# Patient Record
Sex: MALE | Race: AMERICAN INDIAN OR ALASKA NATIVE
[De-identification: names, ages, dates, MRNs, and addresses within clinical notes are randomized per-mention and may not be internally consistent; named-entity substitution may affect disease eponyms.]

---

## 2018-09-18 ENCOUNTER — HOSPITAL ENCOUNTER (EMERGENCY)
Dept: HOSPITAL 5 - ED | Age: 43
Discharge: HOME | End: 2018-09-18
Payer: SELF-PAY

## 2018-09-18 VITALS — DIASTOLIC BLOOD PRESSURE: 56 MMHG | SYSTOLIC BLOOD PRESSURE: 98 MMHG

## 2018-09-18 DIAGNOSIS — K27.9: Primary | ICD-10-CM

## 2018-09-18 PROCEDURE — 74018 RADEX ABDOMEN 1 VIEW: CPT

## 2018-09-18 PROCEDURE — 99283 EMERGENCY DEPT VISIT LOW MDM: CPT

## 2018-09-18 NOTE — EMERGENCY DEPARTMENT REPORT
ED Abdominal Pain HPI





- General


Chief Complaint: Abdominal Pain


Stated Complaint: STOMACH/BACK PAIN


Time Seen by Provider: 09/18/18 11:07


Source: patient


Mode of arrival: Ambulatory


Limitations: No Limitations





- History of Present Illness


Initial Comments: 





Patient is a 43-year-old American gentleman who is complaining of 3 days of 

epigastric discomfort.  Patient states he has some mild nausea but no vomiting.

  Patient also does have some slight loose stools.  Patient has a history of 

ulcers and states the pain feels somewhat similar.  Patient denies any fevers 

chills cough cold congestion at this time.  Patient states the pain is not 

worse with eating or laying flat.  Patient states pain is nonradiating





- Related Data


 Previous Rx's











 Medication  Instructions  Recorded  Last Taken  Type


 


Dicyclomine [Bentyl] 10 mg PO QID #15 capsule 09/18/18 Unknown Rx


 


Pantoprazole [Protonix TAB] 20 mg PO QDAY #30 tablet. 09/18/18 Unknown Rx


 


traMADol [Ultram] 50 mg PO Q6HR PRN #10 tablet 09/18/18 Unknown Rx











 Allergies











Allergy/AdvReac Type Severity Reaction Status Date / Time


 


No Known Allergies Allergy   Unverified 09/18/18 08:47














ED Review of Systems


ROS: 


Stated complaint: STOMACH/BACK PAIN


Other details as noted in HPI





Comment: All other systems reviewed and negative





ED Past Medical Hx





- Past Medical History


Additional medical history: ulcer





- Surgical History


Past Surgical History?: No





- Social History


Smoking Status: Never Smoker


Substance Use Type: None





- Medications


Home Medications: 


 Home Medications











 Medication  Instructions  Recorded  Confirmed  Last Taken  Type


 


Dicyclomine [Bentyl] 10 mg PO QID #15 capsule 09/18/18  Unknown Rx


 


Pantoprazole [Protonix TAB] 20 mg PO QDAY #30 tablet. 09/18/18  Unknown Rx


 


traMADol [Ultram] 50 mg PO Q6HR PRN #10 tablet 09/18/18  Unknown Rx














ED Physical Exam





- General


Limitations: No Limitations


General appearance: alert, in no apparent distress





- Head


Head exam: Present: atraumatic, normocephalic





- Eye


Eye exam: Present: normal appearance





- ENT


ENT exam: Present: mucous membranes moist





- Neck


Neck exam: Present: normal inspection





- Respiratory


Respiratory exam: Present: normal lung sounds bilaterally.  Absent: respiratory 

distress, wheezes, rales





- Cardiovascular


Cardiovascular Exam: Present: regular rate, normal rhythm.  Absent: systolic 

murmur, diastolic murmur, rubs, gallop





- GI/Abdominal


GI/Abdominal exam: Present: soft, normal bowel sounds.  Absent: distended, 

tenderness, guarding, rebound





- Rectal


Rectal exam: Present: deferred





- Extremities Exam


Extremities exam: Present: normal inspection





- Back Exam


Back exam: Present: normal inspection





- Neurological Exam


Neurological exam: Present: alert, oriented X3





- Psychiatric


Psychiatric exam: Present: normal affect, normal mood





- Skin


Skin exam: Present: warm, dry, intact, normal color.  Absent: rash





ED Course


 Vital Signs











  09/18/18





  08:47


 


Temperature 97.7 F


 


Pulse Rate 54 L


 


Respiratory 16





Rate 


 


Blood Pressure 98/56


 


O2 Sat by Pulse 98





Oximetry 














ED Medical Decision Making





- Radiology Data





KUB= NAP








- Medical Decision Making





Patient appears well.  Patient most likely has some peptic ulcer disease 

causing his discomfort.  Patient will be placed on Ms. for symptomatic relief 

and will be discharged home with follow-up with GI.


Critical care attestation.: 


If time is entered above; I have spent that time in minutes in the direct care 

of this critically ill patient, excluding procedure time.








ED Disposition


Clinical Impression: 


 PUD (peptic ulcer disease)





Disposition: DC-01 TO HOME OR SELFCARE


Is pt being admited?: No


Does the pt Need Aspirin: No


Condition: Stable


Instructions:  Peptic Ulcer (ED), Diet for Ulcers and Gastritis (ED)


Prescriptions: 


Dicyclomine [Bentyl] 10 mg PO QID #15 capsule


Pantoprazole [Protonix TAB] 20 mg PO QDAY #30 tablet.


traMADol [Ultram] 50 mg PO Q6HR PRN #10 tablet


 PRN Reason: Pain


Referrals: 


MYRTLE URBAN MD [Staff Physician] - 3-5 Days


Time of Disposition: 12:13

## 2018-09-18 NOTE — XRAY REPORT
AP ABDOMEN:



HISTORY: Epigastric pain, history of ulcers.



The abdominal gas pattern is unremarkable.  No masses or

organomegaly is identified and there is no gross evidence of free air 

or fluid.  No significant soft tissue calcifications are noted.



IMPRESSION:

Unremarkable abdomen.

## 2022-04-30 ENCOUNTER — TELEPHONE ENCOUNTER (OUTPATIENT)
Dept: URBAN - METROPOLITAN AREA CLINIC 121 | Facility: CLINIC | Age: 47
End: 2022-04-30

## 2022-05-01 ENCOUNTER — TELEPHONE ENCOUNTER (OUTPATIENT)
Dept: URBAN - METROPOLITAN AREA CLINIC 121 | Facility: CLINIC | Age: 47
End: 2022-05-01